# Patient Record
Sex: MALE
[De-identification: names, ages, dates, MRNs, and addresses within clinical notes are randomized per-mention and may not be internally consistent; named-entity substitution may affect disease eponyms.]

---

## 2019-02-26 PROBLEM — Z00.00 ENCOUNTER FOR PREVENTIVE HEALTH EXAMINATION: Status: ACTIVE | Noted: 2019-02-26

## 2019-02-28 ENCOUNTER — APPOINTMENT (OUTPATIENT)
Dept: ORTHOPEDIC SURGERY | Facility: CLINIC | Age: 49
End: 2019-02-28
Payer: COMMERCIAL

## 2019-02-28 VITALS
DIASTOLIC BLOOD PRESSURE: 90 MMHG | BODY MASS INDEX: 30.06 KG/M2 | SYSTOLIC BLOOD PRESSURE: 132 MMHG | WEIGHT: 210 LBS | HEIGHT: 70 IN | HEART RATE: 104 BPM

## 2019-02-28 DIAGNOSIS — Z80.9 FAMILY HISTORY OF MALIGNANT NEOPLASM, UNSPECIFIED: ICD-10-CM

## 2019-02-28 DIAGNOSIS — Z87.09 PERSONAL HISTORY OF OTHER DISEASES OF THE RESPIRATORY SYSTEM: ICD-10-CM

## 2019-02-28 DIAGNOSIS — Z78.9 OTHER SPECIFIED HEALTH STATUS: ICD-10-CM

## 2019-02-28 PROCEDURE — 99203 OFFICE O/P NEW LOW 30 MIN: CPT | Mod: 25

## 2019-02-28 PROCEDURE — 20551 NJX 1 TENDON ORIGIN/INSJ: CPT | Mod: LT

## 2019-02-28 NOTE — HISTORY OF PRESENT ILLNESS
[Right] : right hand dominant [FreeTextEntry1] : He comes in today for evaluation of left lateral elbow pain for the past 3 months.  He denies an injury.

## 2019-02-28 NOTE — DISCUSSION/SUMMARY
[FreeTextEntry1] : He has findings consistent with left elbow pain secondary to lateral epicondylitis.\par \par I had a discussion regarding today's visit, the diagnosis, and treatment options / recommendations.  He opted for a cortisone injection today.  He understands that this may not provide her with long-term relief.\par \par The patient has agreed to this plan of management and has expressed full understanding.  All questions were fully answered to the patient's satisfaction.\par \par Over 50% of the time spent with the patient was on counseling the patient on the above diagnosis, treatment plan and prognosis.

## 2019-02-28 NOTE — PROCEDURE
[FreeTextEntry1] : -  After a discussion of risks and benefits, the patient agreed to proceed with a cortisone injection.  \par -  Side: Left lateral epicondyar region.\par -  Medications injected: 1 cc of 1% lidocaine and 1 cc of Betamethasone, 6mg/cc, using sterile technique.\par -  Immediate improvement of the symptoms, secondary to the anesthetic effects of the injection, was noted.\par -  Patient was told that the pain usually worsens for a day or two, and should then begin to improve.  \par -  Instructions: Patient was instructed on the use of ice, anti-inflammatory agents or Tylenol, and activity modification. \par -  Follow-up: Within 2-4 weeks to assess response to the injection.

## 2019-03-26 PROBLEM — M77.12 LATERAL EPICONDYLITIS OF LEFT ELBOW: Status: ACTIVE | Noted: 2019-02-28

## 2019-03-28 ENCOUNTER — APPOINTMENT (OUTPATIENT)
Dept: ORTHOPEDIC SURGERY | Facility: CLINIC | Age: 49
End: 2019-03-28
Payer: COMMERCIAL

## 2019-03-28 VITALS — BODY MASS INDEX: 30.06 KG/M2 | WEIGHT: 210 LBS | HEIGHT: 70 IN

## 2019-03-28 DIAGNOSIS — M77.12 LATERAL EPICONDYLITIS, LEFT ELBOW: ICD-10-CM

## 2019-03-28 PROCEDURE — 99213 OFFICE O/P EST LOW 20 MIN: CPT

## 2019-03-28 NOTE — PHYSICAL EXAM
[de-identified] : - Constitutional: This is a well-developed, well-nourished male, in no obvious distress.  He was accompanied by his wife today.\par - Psych: Patient is alert and oriented to person, place and time.  Patient has a normal mood and affect.\par - Musculoskeletal: Gait is normal.  \par - Neuro: Strength and sensation are intact throughout the upper extremities.  Patient has normal coordination.\par - Respiratory:  Patient exhibits no evidence of shortness of breath or difficulty breathing.\par - Skin: No rashes, lesions, or other abnormalities are noted in the upper extremities.\par \par ---\par \par Examination of his left elbow demonstrates no further swelling, or tenderness along bilateral epicondyle.  He has full painless motion.  There is no pain to resisted wrist extension.  He remains neurovascularly intact distally.

## 2019-03-28 NOTE — HISTORY OF PRESENT ILLNESS
[FreeTextEntry1] : 4 weeks status post left lateral epicondylitis cortisone injection #1.  He is doing well, and no longer has pain.

## 2019-03-28 NOTE — DISCUSSION/SUMMARY
[FreeTextEntry1] : Further treatment options / recommendations were discussed.  At this time, I recommended a home exercise program.  He will followup if his symptoms recur in the future.\par \par I had a discussion regarding today's visit, the diagnosis, and my treatment recommendations.  The patient has agreed to this plan of management and has expressed full understanding.  All questions were fully answered to the patient's satisfaction.\par \par I spent at least 25 minutes of face-to-face time with the patient.  Over 50% of this time was spent on counseling the patient on the above diagnosis, treatment plan and prognosis.

## 2024-09-27 NOTE — PHYSICAL EXAM
Constitutional: positive for fatigue.  Skin: no skin rashes or lesions of concern.  HEENT:  no headache, visual difficulty, or hearing difficulty, no dental problems.  Breast:  no breast lumps .  Cardiovascular:  no chest pain or palpitations.  Respiratory:  no cough or shortness of breath.  Gastrointestinal:  no abdominal pain or change in bowel movements, no dysphagia, nausea or vomiting.  Genitourinary:    no urinary difficulty.  Gynecologic: ? Vaginal prolapse. More pressure. Has seen PT in the past for this.  Musculoskeletal: no joint pain or swelling.  Neurologic: no headache.  Hematologic:  no known anemia or bleeding disorder.  Endocrine:  no polyuria, polydipsia, no heat or cold intolerance.         Health Maintenance       Depression Screening (Yearly)  Overdue since 5/12/2024    COVID-19 Vaccine (1 - 2023-24 season)  Never done    Influenza Vaccine (1)  Due since 9/1/2024           Following review of the above:  Patient is not proceeding with: COVID-19 and Influenza    Note: Refer to final orders and clinician documentation.             [de-identified] : - Constitutional: This is a well-developed, well-nourished male, in no obvious distress.  He was accompanied by his wife today.\par - Psych: Patient is alert and oriented to person, place and time.  Patient has a normal mood and affect.\par - Cardiovascular: Normal pulses throughout the upper extremities.   \par - Musculoskeletal: Gait is normal.  \par - Neuro: Strength and sensation are intact throughout the upper extremities.  Patient has normal coordination.\par - Respiratory:  Patient exhibits no evidence of shortness of breath or difficulty breathing.\par - Skin: No rashes, lesions, or other abnormalities are noted in the upper extremities.\par \par ---\par \par Side: Left Elbow\par -  There is tenderness along the lateral epicondyle.\par -  There is no associated swelling along the lateral epicondyle. \par -  There is pain at the lateral epicondyle with resisted wrist extension.  \par -  There is no tenderness along the radial tunnel.  \par -  There is no tenderness along the lateral joint line.\par -  There is no evidence of elbow instability.  \par -  There is no tenderness along the medial epicondyle.  \par -  There is full motion as compared to the contralateral elbow.  \par -  There are no neurovascular deficits noted distally.  \par -  Examination of the shoulder and wrist and hand are within normal limits.

## 2024-10-22 NOTE — REVIEW OF SYSTEMS
[Right] : right [Negative] : Allergic/Immunologic Detail Level: Zone Continue Regimen: Daily sunscreen SPF 30+ Broad Spectrum